# Patient Record
Sex: MALE | Race: BLACK OR AFRICAN AMERICAN | Employment: PART TIME | URBAN - METROPOLITAN AREA
[De-identification: names, ages, dates, MRNs, and addresses within clinical notes are randomized per-mention and may not be internally consistent; named-entity substitution may affect disease eponyms.]

---

## 2021-12-05 ENCOUNTER — HOSPITAL ENCOUNTER (EMERGENCY)
Facility: HOSPITAL | Age: 51
Discharge: HOME | End: 2021-12-05
Attending: EMERGENCY MEDICINE | Admitting: EMERGENCY MEDICINE
Payer: OTHER GOVERNMENT

## 2021-12-05 VITALS
OXYGEN SATURATION: 99 % | HEART RATE: 78 BPM | DIASTOLIC BLOOD PRESSURE: 97 MMHG | WEIGHT: 120 LBS | SYSTOLIC BLOOD PRESSURE: 145 MMHG | RESPIRATION RATE: 18 BRPM | TEMPERATURE: 98.2 F

## 2021-12-05 DIAGNOSIS — U07.1 INFECTION DUE TO 2019 NOVEL CORONAVIRUS: ICD-10-CM

## 2021-12-05 LAB
FLUAV RNA SPEC QL NAA+PROBE: NEGATIVE
FLUBV RNA RESP QL NAA+PROBE: NEGATIVE
SARS-COV-2 RNA RESP QL NAA+PROBE: POSITIVE

## 2021-12-05 PROCEDURE — C9803 HOPD COVID-19 SPEC COLLECT: HCPCS

## 2021-12-05 PROCEDURE — 87636 SARSCOV2 & INF A&B AMP PRB: CPT | Performed by: EMERGENCY MEDICINE

## 2021-12-05 PROCEDURE — 99283 EMERGENCY DEPT VISIT LOW MDM: CPT

## 2021-12-05 ASSESSMENT — ENCOUNTER SYMPTOMS
COUGH: 1
NAUSEA: 0
FEVER: 0
SHORTNESS OF BREATH: 0
DIARRHEA: 0
ABDOMINAL PAIN: 0
RHINORRHEA: 1
VOMITING: 0

## 2021-12-05 NOTE — ED TRIAGE NOTES
Patient reports COVID symptoms that started 2 days ago. He reports runny nose, congestion, cough and loss of smell. He has recently been exposed to COVID.

## 2021-12-05 NOTE — ED PROVIDER NOTES
EMERGENCY DEPARTMENT ENCOUNTER      NAME: Megan Aguilar  AGE: 50 year old male  YOB: 1970  MRN: [id number]  EVALUATION DATE & TIME: No admission date for patient encounter.    PCP: No primary care provider on file.    ED PROVIDER: Marybeth Gilbert M.D.      CHIEF COMPLAINT     Chief Complaint   Patient presents with     Covid Concern         FINAL IMPRESSION:     1. Infection due to 2019 novel coronavirus          MEDICAL DECISION MAKING:       Pertinent Labs & Imaging studies reviewed. (See chart for details)    50 year old male presents to the Emergency Department for evaluation of concern about Covid.    Patient presents complaining of runny nose congestion loss of taste.  He was around a friend who called him and told him that she was positive for Covid.    He otherwise denies chest pain shortness of breath abdominal pain vomiting diarrhea feeling fainting.    Examination he is well-appearing.  Cardiopulmonary no acute abnormalities.  Abdomen is soft.    Labs reveal positive Covid.    I obtain vitals with extremity inpatient.  His saturation remained at 100%.  Heart rate of 77.    We will give him a work excuse discussed with him quarantining always wearing a mask.  Will refer him to the get will loop.  Patient given a pulse ox.    I do not think patient needs any imaging at this moment.  No chest pain no shortness of breath no vomiting no diarrhea.  Only complaint is nasal congestion and loss of smell.    Patient felt comfortable this plan verbalizes understanding of discharge instructions and return precautions.  Discharged ambulatory in stable condition.           Differential Diagnosis (include but not limited to)  Covid PE pneumonia congestive heart failure bacterial pneumonia others.      Vital Signs: Reviewed  EKG:  Imaging:  Home Meds: Reviewed  ED meds/abx: None  Fluids: None    Labs  Covid positive        Review of Previous Records        Consults      ED COURSE     4:35 PM I met with  the patient, obtained history, performed an initial exam, and discussed options and plan for diagnostics and treatment here in the ED.    4:57 PM patient exerted does not desaturate.  Verbalizes understanding of discharge instructions and return precautions.  Given a pulse ox      At the conclusion of the encounter I discussed the results of all of the tests and the disposition. The questions were answered. The patient acknowledged understanding and was agreeable with the care plan.         MEDICATIONS GIVEN IN THE EMERGENCY:   Medications - No data to display    NEW PRESCRIPTIONS STARTED AT TODAY'S ER VISIT     New Prescriptions    No medications on file          =================================================================    HPI     Patient information was obtained from: Patient    Use of : N/A         Megan Aguilar is a 50 year old male who presents by private auto for evaluation of COVID concerns.     On 12/2/21 patient had onset of rhinorrhea, congestion, cough, loss of smell, and COVID like symptoms. He had an exposure to a COVID positive patient 11/25, and he presents today in the ED for testing.     He denies fever, nausea, vomit, diarrhea, abdominal pain, shortness of breath, chest pain, or any other complaints at this time.     Shx: Patient endorses in smoking and alcohol consumption. He denies drug use.       REVIEW OF SYSTEMS   Review of Systems   Constitutional: Negative for fever.   HENT: Positive for congestion and rhinorrhea.         Positive for loss of smell   Respiratory: Positive for cough. Negative for shortness of breath.    Cardiovascular: Negative for chest pain.   Gastrointestinal: Negative for abdominal pain, diarrhea, nausea and vomiting.   All other systems reviewed and are negative.       PAST MEDICAL HISTORY:   No past medical history on file.    PAST SURGICAL HISTORY:   No past surgical history on file.      CURRENT MEDICATIONS:   ASPIRIN 81 MG OR TABS          ALLERGIES:   No Known Allergies    FAMILY HISTORY:   No family history on file.    SOCIAL HISTORY:     Social History     Socioeconomic History     Marital status: Single     Spouse name: Not on file     Number of children: Not on file     Years of education: Not on file     Highest education level: Not on file   Occupational History     Not on file   Tobacco Use     Smoking status: Current Every Day Smoker     Smokeless tobacco: Not on file   Substance and Sexual Activity     Alcohol use: Yes     Drug use: Yes     Sexual activity: Not on file   Other Topics Concern     Not on file   Social History Narrative     Not on file     Social Determinants of Health     Financial Resource Strain: Not on file   Food Insecurity: Not on file   Transportation Needs: Not on file   Physical Activity: Not on file   Stress: Not on file   Social Connections: Not on file   Intimate Partner Violence: Not on file   Housing Stability: Not on file       VITALS:   /82 (BP Location: Left arm)   Pulse 77   Temp 98.2  F (36.8  C) (Temporal)   Resp 16   Wt 54.4 kg (120 lb)   SpO2 99%     PHYSICAL EXAM     Physical Exam  Vitals and nursing note reviewed.   Constitutional:       General: He is not in acute distress.     Appearance: Normal appearance. He is normal weight. He is not ill-appearing, toxic-appearing or diaphoretic.      Comments: Nontoxic cooperative and pleasant appears in no distress.   HENT:      Head: Atraumatic.      Right Ear: Tympanic membrane normal.      Left Ear: Tympanic membrane normal.      Mouth/Throat:      Mouth: Mucous membranes are moist.   Eyes:      Extraocular Movements: Extraocular movements intact.   Cardiovascular:      Rate and Rhythm: Normal rate and regular rhythm.      Pulses: Normal pulses.      Heart sounds: Normal heart sounds.   Pulmonary:      Effort: Pulmonary effort is normal.      Breath sounds: Normal breath sounds.   Abdominal:      Palpations: Abdomen is soft.   Musculoskeletal:          General: Normal range of motion.      Cervical back: Normal range of motion and neck supple.   Skin:     General: Skin is warm.      Capillary Refill: Capillary refill takes less than 2 seconds.   Neurological:      General: No focal deficit present.      Mental Status: He is alert and oriented to person, place, and time.               LAB:     All pertinent labs reviewed and interpreted.  Labs Ordered and Resulted from Time of ED Arrival to Time of ED Departure   INFLUENZA A/B & SARS-COV2 PCR MULTIPLEX - Abnormal       Result Value    Influenza A PCR Negative      Influenza B PCR Negative      SARS CoV2 PCR Positive (*)         RADIOLOGY:     Reviewed all pertinent imaging. Please see official radiology report.  No orders to display        EKG:       I have independently reviewed and interpreted the EKG(s) documented above.      PROCEDURES:     Procedures      I, Christelle Pepper, am serving as a scribe to document services personally performed by Dr. Gilbert based on my observation and the provider's statements to me. I, Marybeth Gilbert MD attest that Christelle Pepper is acting in a scribe capacity, has observed my performance of the services and has documented them in accordance with my direction.    Marybeth Gilbert M.D.  Emergency Medicine  [facility] EMERGENCY DEPARTMENT  [facility] [facility] [id number] [phone]  Dept: [phone]       Marybeth Gilbert MD  12/05/21 [id number]

## 2021-12-05 NOTE — DISCHARGE INSTRUCTIONS
Read And follow the discharge orders.    You need to quarantine for at least 10 days as recommended by the CDC.    Your blood pressure was elevated close I am follow-up with your doctor.    Use the pulse oxymetry as instructed.  If it is really 92 or less for the emergency department.    We are going to call you to see how you doing.    Take Lots of liquids still hydrated take Tylenol or ibuprofen as needed for fever.    Return for any concerns.